# Patient Record
Sex: MALE | URBAN - METROPOLITAN AREA
[De-identification: names, ages, dates, MRNs, and addresses within clinical notes are randomized per-mention and may not be internally consistent; named-entity substitution may affect disease eponyms.]

---

## 2021-12-05 ENCOUNTER — VIRTUAL VISIT (OUTPATIENT)
Dept: URGENT CARE | Facility: CLINIC | Age: 66
End: 2021-12-05
Payer: COMMERCIAL

## 2021-12-05 DIAGNOSIS — R09.81 NASAL CONGESTION: Primary | ICD-10-CM

## 2021-12-05 DIAGNOSIS — J01.00 ACUTE NON-RECURRENT MAXILLARY SINUSITIS: ICD-10-CM

## 2021-12-05 PROCEDURE — 99441 PR PHYSICIAN TELEPHONE EVALUATION 5-10 MIN: CPT

## 2021-12-05 RX ORDER — AZELASTINE 1 MG/ML
1 SPRAY, METERED NASAL 2 TIMES DAILY
Qty: 30 ML | Refills: 0 | Status: SHIPPED | OUTPATIENT
Start: 2021-12-05

## 2021-12-05 NOTE — PROGRESS NOTES
Moody is a 66 year old who is being evaluated via a billable telephone visit.        Assessment & Plan     Nasal congestion    - azelastine (ASTELIN) 0.1 % nasal spray; Spray 1 spray into both nostrils 2 times daily    Acute non-recurrent maxillary sinusitis  Will watch and wait on symptoms her sinus infection.  Recommend not filling this prescription until December 9 when he is off his quarantine for Covid.    - amoxicillin-clavulanate (AUGMENTIN) 875-125 MG tablet; Take 1 tablet by mouth 2 times daily for 7 days      15 minutes spent on the date of the encounter doing chart review, patient visit and documentation        See Patient Instructions    Return in about 1 week (around 12/12/2021), or if symptoms worsen or fail to improve.    Virtual Urgent Care  [facility] VIRTUAL URGENT CARE    Subjective   Moody is a 66 year old who presents for the following health issues     HPI     66-year-old male presents to virtual urgent care via a telephone visit for nasal congestion.  He tested positive for Covid with a home test 6 days ago after having symptoms of fatigue, runny nose and headache.  Has been using Tylenol for his symptom management.  Has noticed that at nighttime he develops severe nasal congestion when he lays down.  Does not experience this during the day.  Denies any chest pain or shortness of breath.  Is using a Taty pot to help with congestion as well as Vicks vapor rub.    Review of Systems   Constitutional, HEENT, cardiovascular, pulmonary, gi and gu systems are negative, except as otherwise noted.      Objective           Vitals:  No vitals were obtained today due to virtual visit.    Physical Exam   healthy, alert and no distress  PSYCH: Alert and oriented times 3; coherent speech, normal   rate and volume, able to articulate logical thoughts, able   to abstract reason, no tangential thoughts, no hallucinations   or delusions  His affect is normal  RESP: No cough, no audible wheezing,  able to talk in full sentences  Remainder of exam unable to be completed due to telephone visits            Phone call duration: 7 minutes

## 2021-12-05 NOTE — PATIENT INSTRUCTIONS
Prescription for Astelin nasal spray was sent into your pharmacy that you can  today.  This is for nasal congestion.  Use it as directed.  I also sent in a prescription for Augmentin for possible sinus infection.  I want you to wait until Friday to fill this.  Your congestion is most likely related to your Covid virus.